# Patient Record
Sex: MALE | Race: OTHER | Employment: STUDENT | URBAN - METROPOLITAN AREA
[De-identification: names, ages, dates, MRNs, and addresses within clinical notes are randomized per-mention and may not be internally consistent; named-entity substitution may affect disease eponyms.]

---
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
## 2022-09-20 ENCOUNTER — OFFICE VISIT (OUTPATIENT)
Dept: FAMILY MEDICINE CLINIC | Age: 22
End: 2022-09-20
Payer: COMMERCIAL

## 2022-09-20 ENCOUNTER — TELEPHONE (OUTPATIENT)
Dept: FAMILY MEDICINE CLINIC | Age: 22
End: 2022-09-20

## 2022-09-20 VITALS
BODY MASS INDEX: 31.25 KG/M2 | HEIGHT: 71 IN | HEART RATE: 92 BPM | SYSTOLIC BLOOD PRESSURE: 120 MMHG | RESPIRATION RATE: 16 BRPM | DIASTOLIC BLOOD PRESSURE: 80 MMHG | WEIGHT: 223.2 LBS | OXYGEN SATURATION: 98 %

## 2022-09-20 DIAGNOSIS — Z13.31 POSITIVE DEPRESSION SCREENING: ICD-10-CM

## 2022-09-20 DIAGNOSIS — Z00.00 ENCOUNTER FOR MEDICAL EXAMINATION TO ESTABLISH CARE: ICD-10-CM

## 2022-09-20 DIAGNOSIS — F41.9 SEVERE ANXIETY: ICD-10-CM

## 2022-09-20 DIAGNOSIS — F32.A DEPRESSION, UNSPECIFIED DEPRESSION TYPE: Primary | ICD-10-CM

## 2022-09-20 PROCEDURE — 1036F TOBACCO NON-USER: CPT | Performed by: NURSE PRACTITIONER

## 2022-09-20 PROCEDURE — G8427 DOCREV CUR MEDS BY ELIG CLIN: HCPCS | Performed by: NURSE PRACTITIONER

## 2022-09-20 PROCEDURE — G8417 CALC BMI ABV UP PARAM F/U: HCPCS | Performed by: NURSE PRACTITIONER

## 2022-09-20 PROCEDURE — 36415 COLL VENOUS BLD VENIPUNCTURE: CPT | Performed by: NURSE PRACTITIONER

## 2022-09-20 PROCEDURE — 99214 OFFICE O/P EST MOD 30 MIN: CPT | Performed by: NURSE PRACTITIONER

## 2022-09-20 RX ORDER — BUSPIRONE HYDROCHLORIDE 5 MG/1
5 TABLET ORAL 3 TIMES DAILY
Qty: 90 TABLET | Refills: 1 | Status: SHIPPED | OUTPATIENT
Start: 2022-09-20 | End: 2022-10-20

## 2022-09-20 SDOH — ECONOMIC STABILITY: FOOD INSECURITY: WITHIN THE PAST 12 MONTHS, YOU WORRIED THAT YOUR FOOD WOULD RUN OUT BEFORE YOU GOT MONEY TO BUY MORE.: NEVER TRUE

## 2022-09-20 SDOH — ECONOMIC STABILITY: FOOD INSECURITY: WITHIN THE PAST 12 MONTHS, THE FOOD YOU BOUGHT JUST DIDN'T LAST AND YOU DIDN'T HAVE MONEY TO GET MORE.: NEVER TRUE

## 2022-09-20 ASSESSMENT — PATIENT HEALTH QUESTIONNAIRE - PHQ9
SUM OF ALL RESPONSES TO PHQ QUESTIONS 1-9: 17
4. FEELING TIRED OR HAVING LITTLE ENERGY: 2
6. FEELING BAD ABOUT YOURSELF - OR THAT YOU ARE A FAILURE OR HAVE LET YOURSELF OR YOUR FAMILY DOWN: 2
10. IF YOU CHECKED OFF ANY PROBLEMS, HOW DIFFICULT HAVE THESE PROBLEMS MADE IT FOR YOU TO DO YOUR WORK, TAKE CARE OF THINGS AT HOME, OR GET ALONG WITH OTHER PEOPLE: 2
7. TROUBLE CONCENTRATING ON THINGS, SUCH AS READING THE NEWSPAPER OR WATCHING TELEVISION: 3
SUM OF ALL RESPONSES TO PHQ9 QUESTIONS 1 & 2: 4
5. POOR APPETITE OR OVEREATING: 3
2. FEELING DOWN, DEPRESSED OR HOPELESS: 2
9. THOUGHTS THAT YOU WOULD BE BETTER OFF DEAD, OR OF HURTING YOURSELF: 0
1. LITTLE INTEREST OR PLEASURE IN DOING THINGS: 2
8. MOVING OR SPEAKING SO SLOWLY THAT OTHER PEOPLE COULD HAVE NOTICED. OR THE OPPOSITE, BEING SO FIGETY OR RESTLESS THAT YOU HAVE BEEN MOVING AROUND A LOT MORE THAN USUAL: 1
3. TROUBLE FALLING OR STAYING ASLEEP: 2
SUM OF ALL RESPONSES TO PHQ QUESTIONS 1-9: 17

## 2022-09-20 ASSESSMENT — ENCOUNTER SYMPTOMS
RECTAL PAIN: 0
ABDOMINAL DISTENTION: 0
WHEEZING: 0
BACK PAIN: 0
SINUS PAIN: 0
SINUS PRESSURE: 0
VOMITING: 0
NAUSEA: 0
EYE DISCHARGE: 0
SORE THROAT: 0
TROUBLE SWALLOWING: 0
COUGH: 0
EYE REDNESS: 0
BLOOD IN STOOL: 0
ABDOMINAL PAIN: 0
CHEST TIGHTNESS: 0
EYE ITCHING: 0
FACIAL SWELLING: 0
EYE PAIN: 0
SHORTNESS OF BREATH: 0
CONSTIPATION: 0
DIARRHEA: 0
COLOR CHANGE: 0

## 2022-09-20 ASSESSMENT — ANXIETY QUESTIONNAIRES
6. BECOMING EASILY ANNOYED OR IRRITABLE: 3
IF YOU CHECKED OFF ANY PROBLEMS ON THIS QUESTIONNAIRE, HOW DIFFICULT HAVE THESE PROBLEMS MADE IT FOR YOU TO DO YOUR WORK, TAKE CARE OF THINGS AT HOME, OR GET ALONG WITH OTHER PEOPLE: EXTREMELY DIFFICULT
2. NOT BEING ABLE TO STOP OR CONTROL WORRYING: 3
GAD7 TOTAL SCORE: 19
5. BEING SO RESTLESS THAT IT IS HARD TO SIT STILL: 3
4. TROUBLE RELAXING: 3
3. WORRYING TOO MUCH ABOUT DIFFERENT THINGS: 3
7. FEELING AFRAID AS IF SOMETHING AWFUL MIGHT HAPPEN: 1
1. FEELING NERVOUS, ANXIOUS, OR ON EDGE: 3

## 2022-09-20 ASSESSMENT — SOCIAL DETERMINANTS OF HEALTH (SDOH): HOW HARD IS IT FOR YOU TO PAY FOR THE VERY BASICS LIKE FOOD, HOUSING, MEDICAL CARE, AND HEATING?: NOT HARD AT ALL

## 2022-09-20 NOTE — PROGRESS NOTES
[address] [facility] MEDICINE  [address] [facility] [address]  Dept: [phone]  Loc: Brittaney Crawford (:  2000) is a 23 y.o. [facility] patient, here for evaluation of the following chief complaint(s):  New Patient (Previous pt of Dr. Gertrude Mcarthur), Hypertension (States that previous provider had him on 25mg of lisinopril ), and Anxiety      ASSESSMENT/PLAN:  1. Depression, unspecified depression type  -     MA DEPRESSION SCREEN ANNUAL  2. Encounter for medical examination to establish care  3. Positive depression screening  4. Severe anxiety  -     TSH; Future  -     busPIRone (BUSPAR) 5 MG tablet; Take 1 tablet by mouth 3 times daily, Disp-90 tablet, R-1Normal    22 y/o male here to establish care. Long hx of anxiety  MADI 7- score 19  PHQ9- score 17  Patient denies any SI/HI  Was on a medication last year but cannot remmeber the name, he thinks it was prozac, we will call the pharmacy. For the time being will start him on Buspra. There was concern for HTN, but stable today, likely r/t anxiety. Will monitor. He is a , so had a stress test in - normal.   F/u in 2 weeks. Also checking TSH    Return in about 2 weeks (around 10/4/2022) for anxiety f/u . SUBJECTIVE/OBJECTIVE:  Here with mother. 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     Increased   EAP working working with counseling    Works for [employer] for years. Quick to anger. has a past medical history of Anxiety and Hypertension. Review of Systems   Constitutional:  Negative for activity change, appetite change, fatigue, fever and unexpected weight change.    HENT:  Negative for congestion, dental problem, ear pain, facial swelling, mouth sores, postnasal drip, sinus pressure, sinus pain, sore throat and trouble swallowing. Eyes:  Negative for pain, discharge, redness, itching and visual disturbance. Respiratory:  Negative for cough, chest tightness, shortness of breath and wheezing. Cardiovascular:  Negative for chest pain, palpitations and leg swelling. Gastrointestinal:  Negative for abdominal distention, abdominal pain, blood in stool, constipation, diarrhea, nausea, rectal pain and vomiting. Endocrine: Negative for cold intolerance and heat intolerance. Genitourinary:  Negative for difficulty urinating, dysuria, frequency, hematuria, penile pain, scrotal swelling, testicular pain and urgency. Musculoskeletal:  Negative for arthralgias, back pain, gait problem and neck pain. Skin:  Negative for color change, rash and wound. Neurological:  Negative for dizziness, seizures, weakness, light-headedness, numbness and headaches. Psychiatric/Behavioral:  Positive for agitation, dysphoric mood and self-injury. Negative for sleep disturbance. The patient is nervous/anxious. Physical Exam  Vitals and nursing note reviewed. Constitutional:       General: He is not in acute distress. Appearance: Normal appearance. He is well-developed. He is not diaphoretic. HENT:      Head: Normocephalic and atraumatic. Right Ear: Tympanic membrane and external ear normal. Tympanic membrane is not injected or erythematous. Left Ear: Tympanic membrane and external ear normal. Tympanic membrane is not injected or erythematous. Nose: Nose normal.      Mouth/Throat:      Mouth: Mucous membranes are moist.      Pharynx: Oropharynx is clear. Uvula midline. Eyes:      General:         Right eye: No discharge. Left eye: No discharge. Conjunctiva/sclera: Conjunctivae normal.      Pupils: Pupils are equal, round, and reactive to light. Neck:      Thyroid: No thyromegaly.       Trachea: Trachea normal.   Cardiovascular:      Rate and Rhythm: Normal rate and regular rhythm. Pulses: Normal pulses. Heart sounds: Normal heart sounds, S1 normal and S2 normal. No murmur heard. No friction rub. No gallop. Pulmonary:      Effort: Pulmonary effort is normal. No respiratory distress. Breath sounds: Normal breath sounds. No wheezing or rales. Chest:      Chest wall: No tenderness. Abdominal:      General: Bowel sounds are normal. There is no distension. Palpations: Abdomen is soft. There is no mass. Tenderness: There is no abdominal tenderness. There is no guarding or rebound. Musculoskeletal:         General: No tenderness or deformity. Normal range of motion. Cervical back: Full passive range of motion without pain, normal range of motion and neck supple. Lymphadenopathy:      Cervical: No cervical adenopathy. Skin:     General: Skin is warm and dry. Capillary Refill: Capillary refill takes less than 2 seconds. Neurological:      General: No focal deficit present. Mental Status: He is alert and oriented to person, place, and time. Deep Tendon Reflexes: Reflexes are normal and symmetric. Psychiatric:         Attention and Perception: Attention normal.         Mood and Affect: Mood is anxious. Speech: Speech normal.         Behavior: Behavior normal. Behavior is cooperative. Thought Content: Thought content normal. Thought content does not include homicidal or suicidal ideation. Cognition and Memory: Cognition and memory normal.         Judgment: Judgment normal.           An electronic signature was used to authenticate this note.     --CAMDEN Farfan - CNP

## 2022-09-20 NOTE — TELEPHONE ENCOUNTER
Please call [facility] in [facility] and find out what anti depressant he was filling there. It was probably a year ago.  Thank you

## 2022-09-21 LAB — TSH SERPL DL<=0.05 MIU/L-ACNC: 1.38 UIU/ML (ref 0.4–4.2)

## 2022-10-05 ENCOUNTER — OFFICE VISIT (OUTPATIENT)
Dept: FAMILY MEDICINE CLINIC | Age: 22
End: 2022-10-05
Payer: COMMERCIAL

## 2022-10-05 VITALS
OXYGEN SATURATION: 98 % | BODY MASS INDEX: 32.4 KG/M2 | WEIGHT: 231.4 LBS | RESPIRATION RATE: 16 BRPM | DIASTOLIC BLOOD PRESSURE: 68 MMHG | HEART RATE: 90 BPM | HEIGHT: 71 IN | SYSTOLIC BLOOD PRESSURE: 120 MMHG

## 2022-10-05 DIAGNOSIS — F41.9 SEVERE ANXIETY: ICD-10-CM

## 2022-10-05 DIAGNOSIS — F32.A DEPRESSION, UNSPECIFIED DEPRESSION TYPE: Primary | ICD-10-CM

## 2022-10-05 PROCEDURE — 99214 OFFICE O/P EST MOD 30 MIN: CPT | Performed by: NURSE PRACTITIONER

## 2022-10-05 PROCEDURE — 1036F TOBACCO NON-USER: CPT | Performed by: NURSE PRACTITIONER

## 2022-10-05 PROCEDURE — G8484 FLU IMMUNIZE NO ADMIN: HCPCS | Performed by: NURSE PRACTITIONER

## 2022-10-05 PROCEDURE — G8417 CALC BMI ABV UP PARAM F/U: HCPCS | Performed by: NURSE PRACTITIONER

## 2022-10-05 PROCEDURE — G8427 DOCREV CUR MEDS BY ELIG CLIN: HCPCS | Performed by: NURSE PRACTITIONER

## 2022-10-05 RX ORDER — FLUOXETINE HYDROCHLORIDE 20 MG/1
20 CAPSULE ORAL DAILY
Qty: 30 CAPSULE | Refills: 5 | Status: SHIPPED | OUTPATIENT
Start: 2022-10-05 | End: 2022-11-04

## 2022-10-05 ASSESSMENT — ENCOUNTER SYMPTOMS
ABDOMINAL DISTENTION: 0
BACK PAIN: 0
EYE DISCHARGE: 0
CONSTIPATION: 0
COLOR CHANGE: 0
CHEST TIGHTNESS: 0
TROUBLE SWALLOWING: 0
SORE THROAT: 0
EYE PAIN: 0
WHEEZING: 0
VOMITING: 0
DIARRHEA: 0
ABDOMINAL PAIN: 0
EYE REDNESS: 0
EYE ITCHING: 0
BLOOD IN STOOL: 0
RECTAL PAIN: 0
FACIAL SWELLING: 0
SINUS PRESSURE: 0
NAUSEA: 0
COUGH: 0
SINUS PAIN: 0
SHORTNESS OF BREATH: 0

## 2022-10-05 NOTE — PROGRESS NOTES
[address] [address]  Dept: [phone]  Loc: Brittaney Crawford (:  2000) is a 23 y.o. male,Established patient, here for evaluation of the following chief complaint(s):  Follow-up (Depression/anxiety- pt states that medication is helping some- still has anxiety )      ASSESSMENT/PLAN:  1. Depression, unspecified depression type  -     FLUoxetine (PROZAC) 20 MG capsule; Take 1 capsule by mouth daily, Disp-30 capsule, R-5Normal  2. Severe anxiety  -     FLUoxetine (PROZAC) 20 MG capsule; Take 1 capsule by mouth daily, Disp-30 capsule, R-5Normal  Patient with continued Anxiety, has improved a bit with Buspat. Continue Buspar. Start prozac 20 mg daily  Pt denies any SI/HI  F/u in 4 weeks. Declines vaccines. Return in about 4 weeks (around 2022) for anxiety . The usual starting dose of fluoxetine is 20 mg taken once daily. For patients who do not respond within two to four weeks, the dose is increased by 10 to 20 mg per day, depending on how well the medication is tolerated. Patients who remain unresponsive to treatment should receive additional increases of 10 to 20 mg per day every two to four weeks as tolerated, to an effective dose. The maximum dose is 60 mg per day. The dose schedule for other SSRIs and the adverse side effects of SSRIs are discussed separately. SUBJECTIVE/OBJECTIVE:  Here to follow up for anxiety, buspar has helped but still having anxiety        has a past medical history of Anxiety and Hypertension. Review of Systems   Constitutional:  Negative for activity change, appetite change, fatigue, fever and unexpected weight change. HENT:  Negative for congestion, dental problem, ear pain, facial swelling, mouth sores, postnasal drip, sinus pressure, sinus pain, sore throat and trouble swallowing.     Eyes:  Negative for pain, discharge, redness, itching and visual disturbance. Respiratory:  Negative for cough, chest tightness, shortness of breath and wheezing. Cardiovascular:  Negative for chest pain, palpitations and leg swelling. Gastrointestinal:  Negative for abdominal distention, abdominal pain, blood in stool, constipation, diarrhea, nausea, rectal pain and vomiting. Endocrine: Negative for cold intolerance and heat intolerance. Genitourinary:  Negative for difficulty urinating, dysuria, frequency, hematuria, penile pain, scrotal swelling, testicular pain and urgency. Musculoskeletal:  Negative for arthralgias, back pain, gait problem and neck pain. Skin:  Negative for color change, rash and wound. Neurological:  Negative for dizziness, seizures, weakness, light-headedness, numbness and headaches. Psychiatric/Behavioral:  Negative for agitation, dysphoric mood and sleep disturbance. The patient is nervous/anxious. Physical Exam  Vitals and nursing note reviewed. Constitutional:       General: He is not in acute distress. Appearance: He is well-developed. He is not diaphoretic. HENT:      Head: Normocephalic and atraumatic. Right Ear: Tympanic membrane and external ear normal. Tympanic membrane is not injected or erythematous. Left Ear: Tympanic membrane and external ear normal. Tympanic membrane is not injected or erythematous. Nose: Nose normal.      Mouth/Throat:      Pharynx: Uvula midline. Eyes:      General:         Right eye: No discharge. Left eye: No discharge. Conjunctiva/sclera: Conjunctivae normal.      Pupils: Pupils are equal, round, and reactive to light. Neck:      Thyroid: No thyromegaly. Trachea: Trachea normal.   Cardiovascular:      Rate and Rhythm: Normal rate and regular rhythm. Pulses: Normal pulses. Heart sounds: Normal heart sounds, S1 normal and S2 normal. No murmur heard. No friction rub. No gallop.    Pulmonary:      Effort: Pulmonary effort is normal. No respiratory distress. Breath sounds: Normal breath sounds. No wheezing or rales. Chest:      Chest wall: No tenderness. Abdominal:      General: Bowel sounds are normal. There is no distension. Palpations: Abdomen is soft. There is no mass. Tenderness: There is no abdominal tenderness. There is no guarding or rebound. Musculoskeletal:         General: No tenderness or deformity. Normal range of motion. Cervical back: Full passive range of motion without pain, normal range of motion and neck supple. Lymphadenopathy:      Cervical: No cervical adenopathy. Skin:     General: Skin is warm and dry. Capillary Refill: Capillary refill takes less than 2 seconds. Neurological:      Mental Status: He is alert and oriented to person, place, and time. Deep Tendon Reflexes: Reflexes are normal and symmetric. Psychiatric:         Attention and Perception: Attention normal.         Mood and Affect: Mood normal.         Behavior: Behavior normal.         Thought Content: Thought content normal. Thought content does not include homicidal or suicidal ideation. Judgment: Judgment normal.           An electronic signature was used to authenticate this note.     --CAMDEN Cordoba - CNP

## 2022-11-09 ENCOUNTER — OFFICE VISIT (OUTPATIENT)
Dept: FAMILY MEDICINE CLINIC | Age: 22
End: 2022-11-09
Payer: COMMERCIAL

## 2022-11-09 VITALS
HEIGHT: 71 IN | RESPIRATION RATE: 16 BRPM | WEIGHT: 225 LBS | SYSTOLIC BLOOD PRESSURE: 106 MMHG | OXYGEN SATURATION: 98 % | HEART RATE: 70 BPM | DIASTOLIC BLOOD PRESSURE: 70 MMHG | BODY MASS INDEX: 31.5 KG/M2

## 2022-11-09 DIAGNOSIS — F32.A DEPRESSION, UNSPECIFIED DEPRESSION TYPE: Primary | ICD-10-CM

## 2022-11-09 DIAGNOSIS — F41.9 SEVERE ANXIETY: ICD-10-CM

## 2022-11-09 PROCEDURE — G8484 FLU IMMUNIZE NO ADMIN: HCPCS | Performed by: NURSE PRACTITIONER

## 2022-11-09 PROCEDURE — G8417 CALC BMI ABV UP PARAM F/U: HCPCS | Performed by: NURSE PRACTITIONER

## 2022-11-09 PROCEDURE — 1036F TOBACCO NON-USER: CPT | Performed by: NURSE PRACTITIONER

## 2022-11-09 PROCEDURE — G8427 DOCREV CUR MEDS BY ELIG CLIN: HCPCS | Performed by: NURSE PRACTITIONER

## 2022-11-09 PROCEDURE — 99214 OFFICE O/P EST MOD 30 MIN: CPT | Performed by: NURSE PRACTITIONER

## 2022-11-09 RX ORDER — BUSPIRONE HYDROCHLORIDE 5 MG/1
TABLET ORAL
COMMUNITY
Start: 2022-10-26 | End: 2022-11-09 | Stop reason: SDUPTHER

## 2022-11-09 RX ORDER — BUSPIRONE HYDROCHLORIDE 10 MG/1
10 TABLET ORAL 3 TIMES DAILY PRN
Qty: 90 TABLET | Refills: 2 | Status: SHIPPED | OUTPATIENT
Start: 2022-11-09 | End: 2022-12-09

## 2022-11-09 ASSESSMENT — ENCOUNTER SYMPTOMS
COUGH: 0
EYE PAIN: 0
ABDOMINAL DISTENTION: 0
WHEEZING: 0
SORE THROAT: 0
BACK PAIN: 0
EYE REDNESS: 0
CHEST TIGHTNESS: 0
SINUS PAIN: 0
COLOR CHANGE: 0
VOMITING: 0
RECTAL PAIN: 0
NAUSEA: 0
SINUS PRESSURE: 0
FACIAL SWELLING: 0
EYE DISCHARGE: 0
TROUBLE SWALLOWING: 0
DIARRHEA: 0
CONSTIPATION: 0
SHORTNESS OF BREATH: 0
EYE ITCHING: 0
ABDOMINAL PAIN: 0
BLOOD IN STOOL: 0

## 2022-11-09 NOTE — PROGRESS NOTES
[address] [address]  Dept: [phone]  Loc: Brittaney Crawford (:  2000) is a 23 y.o. male,Established patient, here for evaluation of the following chief complaint(s):  1 Month Follow-Up (Depression/ anxiety - not much improvement )      ASSESSMENT/PLAN:  1. Depression, unspecified depression type  2. Severe anxiety  Patient denies any SI/HI. He is going to continue Prozac 20 mg for least another 2 weeks to get a full 6 weeks and see how he feels on that. He did state that he has been taking 10 mg of the BuSpar in the morning and 5 mg at night he felt the 10 mg was more helpful, I reassured him that he could take 10 mg 3 times a day if needed for the anxiety and he reported he would likely increase that dose. He is going to update myself in 2 weeks with a made MyChart message on how he feels after a total of 6 weeks on the Prozac. Return in about 2 weeks (around 2022) for send Carroll Zhang a [employer] message update . SUBJECTIVE/OBJECTIVE:  Patient following up after being on Prozac for 4 weeks and taking BuSpar as needed. He states he has not noticed much improvement with his mood with the Prozac, but denies any negative symptoms. Had been taking BuSpar 10 mg in the morning and 5 mg at night.       has a past medical history of Anxiety and Hypertension. Review of Systems   Constitutional:  Negative for activity change, appetite change, fatigue, fever and unexpected weight change. HENT:  Negative for congestion, dental problem, ear pain, facial swelling, mouth sores, postnasal drip, sinus pressure, sinus pain, sore throat and trouble swallowing. Eyes:  Negative for pain, discharge, redness, itching and visual disturbance. Respiratory:  Negative for cough, chest tightness, shortness of breath and wheezing. Cardiovascular:  Negative for chest pain, palpitations and leg swelling. Gastrointestinal:  Negative for abdominal distention, abdominal pain, blood in stool, constipation, diarrhea, nausea, rectal pain and vomiting. Endocrine: Negative for cold intolerance and heat intolerance. Genitourinary:  Negative for difficulty urinating, dysuria, frequency, hematuria, penile pain, scrotal swelling, testicular pain and urgency. Musculoskeletal:  Negative for arthralgias, back pain, gait problem and neck pain. Skin:  Negative for color change, rash and wound. Neurological:  Negative for dizziness, seizures, weakness, light-headedness, numbness and headaches. Psychiatric/Behavioral:  Positive for agitation and dysphoric mood. Negative for sleep disturbance. The patient is nervous/anxious. Physical Exam  Vitals and nursing note reviewed. Constitutional:       General: He is not in acute distress. Appearance: He is well-developed. He is not diaphoretic. HENT:      Head: Normocephalic and atraumatic. Right Ear: Tympanic membrane and external ear normal. Tympanic membrane is not injected or erythematous. Left Ear: Tympanic membrane and external ear normal. Tympanic membrane is not injected or erythematous. Nose: Nose normal.      Mouth/Throat:      Pharynx: Uvula midline. Eyes:      General:         Right eye: No discharge. Left eye: No discharge. Conjunctiva/sclera: Conjunctivae normal.      Pupils: Pupils are equal, round, and reactive to light. Neck:      Thyroid: No thyromegaly. Trachea: Trachea normal.   Cardiovascular:      Rate and Rhythm: Normal rate and regular rhythm. Pulses: Normal pulses. Heart sounds: Normal heart sounds, S1 normal and S2 normal. No murmur heard. No friction rub. No gallop. Pulmonary:      Effort: Pulmonary effort is normal. No respiratory distress. Breath sounds: Normal breath sounds. No wheezing or rales. Chest:      Chest wall: No tenderness.    Abdominal:      General: Bowel sounds are

## 2022-11-18 DIAGNOSIS — F41.9 SEVERE ANXIETY: ICD-10-CM

## 2022-11-18 RX ORDER — BUSPIRONE HYDROCHLORIDE 5 MG/1
TABLET ORAL
Qty: 90 TABLET | Refills: 1 | OUTPATIENT
Start: 2022-11-18

## 2022-12-07 ENCOUNTER — PATIENT MESSAGE (OUTPATIENT)
Dept: FAMILY MEDICINE CLINIC | Age: 22
End: 2022-12-07

## 2022-12-07 NOTE — TELEPHONE ENCOUNTER
From: Roly March  To: Claude Pod  Sent: 12/7/2022 2:17 PM EST  Subject: Fluoxetine dose    After about 3 additional weeks the fluoxetine feels like it has been helping I think were ok to stay at 20 mg.

## 2024-03-24 ASSESSMENT — PATIENT HEALTH QUESTIONNAIRE - PHQ9
2. FEELING DOWN, DEPRESSED OR HOPELESS: MORE THAN HALF THE DAYS
10. IF YOU CHECKED OFF ANY PROBLEMS, HOW DIFFICULT HAVE THESE PROBLEMS MADE IT FOR YOU TO DO YOUR WORK, TAKE CARE OF THINGS AT HOME, OR GET ALONG WITH OTHER PEOPLE: VERY DIFFICULT
7. TROUBLE CONCENTRATING ON THINGS, SUCH AS READING THE NEWSPAPER OR WATCHING TELEVISION: MORE THAN HALF THE DAYS
6. FEELING BAD ABOUT YOURSELF - OR THAT YOU ARE A FAILURE OR HAVE LET YOURSELF OR YOUR FAMILY DOWN: NEARLY EVERY DAY
4. FEELING TIRED OR HAVING LITTLE ENERGY: MORE THAN HALF THE DAYS
1. LITTLE INTEREST OR PLEASURE IN DOING THINGS: NEARLY EVERY DAY
6. FEELING BAD ABOUT YOURSELF - OR THAT YOU ARE A FAILURE OR HAVE LET YOURSELF OR YOUR FAMILY DOWN: NEARLY EVERY DAY
SUM OF ALL RESPONSES TO PHQ QUESTIONS 1-9: 18
4. FEELING TIRED OR HAVING LITTLE ENERGY: MORE THAN HALF THE DAYS
SUM OF ALL RESPONSES TO PHQ QUESTIONS 1-9: 18
8. MOVING OR SPEAKING SO SLOWLY THAT OTHER PEOPLE COULD HAVE NOTICED. OR THE OPPOSITE, BEING SO FIGETY OR RESTLESS THAT YOU HAVE BEEN MOVING AROUND A LOT MORE THAN USUAL: SEVERAL DAYS
3. TROUBLE FALLING OR STAYING ASLEEP: NEARLY EVERY DAY
3. TROUBLE FALLING OR STAYING ASLEEP: NEARLY EVERY DAY
9. THOUGHTS THAT YOU WOULD BE BETTER OFF DEAD, OR OF HURTING YOURSELF: NOT AT ALL
SUM OF ALL RESPONSES TO PHQ QUESTIONS 1-9: 18
1. LITTLE INTEREST OR PLEASURE IN DOING THINGS: NEARLY EVERY DAY
SUM OF ALL RESPONSES TO PHQ9 QUESTIONS 1 & 2: 5
5. POOR APPETITE OR OVEREATING: MORE THAN HALF THE DAYS
9. THOUGHTS THAT YOU WOULD BE BETTER OFF DEAD, OR OF HURTING YOURSELF: NOT AT ALL
8. MOVING OR SPEAKING SO SLOWLY THAT OTHER PEOPLE COULD HAVE NOTICED. OR THE OPPOSITE - BEING SO FIDGETY OR RESTLESS THAT YOU HAVE BEEN MOVING AROUND A LOT MORE THAN USUAL: SEVERAL DAYS
10. IF YOU CHECKED OFF ANY PROBLEMS, HOW DIFFICULT HAVE THESE PROBLEMS MADE IT FOR YOU TO DO YOUR WORK, TAKE CARE OF THINGS AT HOME, OR GET ALONG WITH OTHER PEOPLE: VERY DIFFICULT
5. POOR APPETITE OR OVEREATING: MORE THAN HALF THE DAYS
SUM OF ALL RESPONSES TO PHQ QUESTIONS 1-9: 18
SUM OF ALL RESPONSES TO PHQ QUESTIONS 1-9: 18
7. TROUBLE CONCENTRATING ON THINGS, SUCH AS READING THE NEWSPAPER OR WATCHING TELEVISION: MORE THAN HALF THE DAYS
2. FEELING DOWN, DEPRESSED OR HOPELESS: MORE THAN HALF THE DAYS

## 2024-03-25 ENCOUNTER — OFFICE VISIT (OUTPATIENT)
Dept: FAMILY MEDICINE CLINIC | Age: 24
End: 2024-03-25
Payer: COMMERCIAL

## 2024-03-25 VITALS
HEART RATE: 73 BPM | BODY MASS INDEX: 33.32 KG/M2 | SYSTOLIC BLOOD PRESSURE: 126 MMHG | OXYGEN SATURATION: 99 % | DIASTOLIC BLOOD PRESSURE: 88 MMHG | HEIGHT: 71 IN | WEIGHT: 238 LBS | RESPIRATION RATE: 18 BRPM | TEMPERATURE: 97.5 F

## 2024-03-25 DIAGNOSIS — Z23 NEED FOR TETANUS BOOSTER: ICD-10-CM

## 2024-03-25 DIAGNOSIS — Z00.00 ENCOUNTER FOR WELL ADULT EXAM WITHOUT ABNORMAL FINDINGS: Primary | ICD-10-CM

## 2024-03-25 DIAGNOSIS — R53.83 OTHER FATIGUE: ICD-10-CM

## 2024-03-25 DIAGNOSIS — F41.9 SEVERE ANXIETY: ICD-10-CM

## 2024-03-25 DIAGNOSIS — F32.A DEPRESSION, UNSPECIFIED DEPRESSION TYPE: ICD-10-CM

## 2024-03-25 DIAGNOSIS — Z13.220 LIPID SCREENING: ICD-10-CM

## 2024-03-25 LAB
25(OH)D3 SERPL-MCNC: 21 NG/ML (ref 30–100)
ALBUMIN SERPL BCG-MCNC: 4.9 G/DL (ref 3.5–5.1)
ALP SERPL-CCNC: 86 U/L (ref 38–126)
ALT SERPL W/O P-5'-P-CCNC: 139 U/L (ref 11–66)
ANION GAP SERPL CALC-SCNC: 14 MEQ/L (ref 8–16)
AST SERPL-CCNC: 43 U/L (ref 5–40)
BASOPHILS ABSOLUTE: 0 THOU/MM3 (ref 0–0.1)
BASOPHILS NFR BLD AUTO: 0.5 %
BILIRUB SERPL-MCNC: 0.5 MG/DL (ref 0.3–1.2)
BUN SERPL-MCNC: 13 MG/DL (ref 7–22)
CALCIUM SERPL-MCNC: 10 MG/DL (ref 8.5–10.5)
CHLORIDE SERPL-SCNC: 101 MEQ/L (ref 98–111)
CHOLESTEROL, FASTING: 228 MG/DL (ref 100–199)
CO2 SERPL-SCNC: 25 MEQ/L (ref 23–33)
CREAT SERPL-MCNC: 1 MG/DL (ref 0.4–1.2)
DEPRECATED RDW RBC AUTO: 41 FL (ref 35–45)
EOSINOPHIL NFR BLD AUTO: 0.8 %
EOSINOPHILS ABSOLUTE: 0.1 THOU/MM3 (ref 0–0.4)
ERYTHROCYTE [DISTWIDTH] IN BLOOD BY AUTOMATED COUNT: 12.7 % (ref 11.5–14.5)
GFR SERPL CREATININE-BSD FRML MDRD: > 90 ML/MIN/1.73M2
GLUCOSE FASTING: 105 MG/DL (ref 70–108)
HCT VFR BLD AUTO: 51.8 % (ref 42–52)
HDLC SERPL-MCNC: 30 MG/DL
HGB BLD-MCNC: 18.3 GM/DL (ref 14–18)
IMM GRANULOCYTES # BLD AUTO: 0.03 THOU/MM3 (ref 0–0.07)
IMM GRANULOCYTES NFR BLD AUTO: 0.4 %
LDLC SERPL CALC-MCNC: ABNORMAL MG/DL
LYMPHOCYTES ABSOLUTE: 2.5 THOU/MM3 (ref 1–4.8)
LYMPHOCYTES NFR BLD AUTO: 33.5 %
MCH RBC QN AUTO: 31.4 PG (ref 26–33)
MCHC RBC AUTO-ENTMCNC: 35.3 GM/DL (ref 32.2–35.5)
MCV RBC AUTO: 88.9 FL (ref 80–94)
MONOCYTES ABSOLUTE: 0.5 THOU/MM3 (ref 0.4–1.3)
MONOCYTES NFR BLD AUTO: 6.5 %
NEUTROPHILS NFR BLD AUTO: 58.3 %
NRBC BLD AUTO-RTO: 0 /100 WBC
PATHOLOGIST REVIEW: ABNORMAL
PLATELET # BLD AUTO: 284 THOU/MM3 (ref 130–400)
PMV BLD AUTO: 10 FL (ref 9.4–12.4)
POTASSIUM SERPL-SCNC: 4.2 MEQ/L (ref 3.5–5.2)
PROT SERPL-MCNC: 7.4 G/DL (ref 6.1–8)
RBC # BLD AUTO: 5.83 MILL/MM3 (ref 4.7–6.1)
SCAN OF BLOOD SMEAR: NORMAL
SEGMENTED NEUTROPHILS ABSOLUTE COUNT: 4.3 THOU/MM3 (ref 1.8–7.7)
SODIUM SERPL-SCNC: 140 MEQ/L (ref 135–145)
T4 FREE SERPL-MCNC: 1.21 NG/DL (ref 0.93–1.68)
TRIGLYCERIDE, FASTING: 411 MG/DL (ref 0–199)
TSH SERPL DL<=0.005 MIU/L-ACNC: 1.05 UIU/ML (ref 0.4–4.2)
WBC # BLD AUTO: 7.4 THOU/MM3 (ref 4.8–10.8)

## 2024-03-25 PROCEDURE — G8427 DOCREV CUR MEDS BY ELIG CLIN: HCPCS | Performed by: NURSE PRACTITIONER

## 2024-03-25 PROCEDURE — 36415 COLL VENOUS BLD VENIPUNCTURE: CPT | Performed by: NURSE PRACTITIONER

## 2024-03-25 PROCEDURE — 99395 PREV VISIT EST AGE 18-39: CPT | Performed by: NURSE PRACTITIONER

## 2024-03-25 PROCEDURE — 99213 OFFICE O/P EST LOW 20 MIN: CPT | Performed by: NURSE PRACTITIONER

## 2024-03-25 PROCEDURE — G8484 FLU IMMUNIZE NO ADMIN: HCPCS | Performed by: NURSE PRACTITIONER

## 2024-03-25 PROCEDURE — 90715 TDAP VACCINE 7 YRS/> IM: CPT | Performed by: NURSE PRACTITIONER

## 2024-03-25 PROCEDURE — G8417 CALC BMI ABV UP PARAM F/U: HCPCS | Performed by: NURSE PRACTITIONER

## 2024-03-25 PROCEDURE — 90471 IMMUNIZATION ADMIN: CPT | Performed by: NURSE PRACTITIONER

## 2024-03-25 PROCEDURE — 1036F TOBACCO NON-USER: CPT | Performed by: NURSE PRACTITIONER

## 2024-03-25 RX ORDER — LANOLIN ALCOHOL/MO/W.PET/CERES
3 CREAM (GRAM) TOPICAL NIGHTLY PRN
COMMUNITY

## 2024-03-25 RX ORDER — FLUOXETINE HYDROCHLORIDE 20 MG/1
20 CAPSULE ORAL DAILY
Qty: 30 CAPSULE | Refills: 3 | Status: SHIPPED | OUTPATIENT
Start: 2024-03-25

## 2024-03-25 RX ORDER — BUSPIRONE HYDROCHLORIDE 5 MG/1
5 TABLET ORAL 3 TIMES DAILY
Qty: 90 TABLET | Refills: 2 | Status: SHIPPED | OUTPATIENT
Start: 2024-03-25 | End: 2025-03-25

## 2024-03-25 SDOH — ECONOMIC STABILITY: FOOD INSECURITY: WITHIN THE PAST 12 MONTHS, THE FOOD YOU BOUGHT JUST DIDN'T LAST AND YOU DIDN'T HAVE MONEY TO GET MORE.: NEVER TRUE

## 2024-03-25 SDOH — ECONOMIC STABILITY: FOOD INSECURITY: WITHIN THE PAST 12 MONTHS, YOU WORRIED THAT YOUR FOOD WOULD RUN OUT BEFORE YOU GOT MONEY TO BUY MORE.: NEVER TRUE

## 2024-03-25 SDOH — ECONOMIC STABILITY: INCOME INSECURITY: HOW HARD IS IT FOR YOU TO PAY FOR THE VERY BASICS LIKE FOOD, HOUSING, MEDICAL CARE, AND HEATING?: NOT HARD AT ALL

## 2024-03-25 SDOH — ECONOMIC STABILITY: HOUSING INSECURITY
IN THE LAST 12 MONTHS, WAS THERE A TIME WHEN YOU DID NOT HAVE A STEADY PLACE TO SLEEP OR SLEPT IN A SHELTER (INCLUDING NOW)?: NO

## 2024-03-25 ASSESSMENT — ENCOUNTER SYMPTOMS
SORE THROAT: 0
COUGH: 0
EYE REDNESS: 0
RECTAL PAIN: 0
NAUSEA: 0
EYE PAIN: 0
COLOR CHANGE: 0
EYE DISCHARGE: 0
TROUBLE SWALLOWING: 0
BLOOD IN STOOL: 0
VOMITING: 0
ABDOMINAL PAIN: 0
CHEST TIGHTNESS: 0
FACIAL SWELLING: 0
CONSTIPATION: 0
SHORTNESS OF BREATH: 0
EYE ITCHING: 0
ABDOMINAL DISTENTION: 0
SINUS PRESSURE: 0
SINUS PAIN: 0
DIARRHEA: 0
WHEEZING: 0
BACK PAIN: 0

## 2024-03-25 ASSESSMENT — ANXIETY QUESTIONNAIRES
7. FEELING AFRAID AS IF SOMETHING AWFUL MIGHT HAPPEN: NOT AT ALL
IF YOU CHECKED OFF ANY PROBLEMS ON THIS QUESTIONNAIRE, HOW DIFFICULT HAVE THESE PROBLEMS MADE IT FOR YOU TO DO YOUR WORK, TAKE CARE OF THINGS AT HOME, OR GET ALONG WITH OTHER PEOPLE: VERY DIFFICULT
2. NOT BEING ABLE TO STOP OR CONTROL WORRYING: MORE THAN HALF THE DAYS
GAD7 TOTAL SCORE: 14
3. WORRYING TOO MUCH ABOUT DIFFERENT THINGS: MORE THAN HALF THE DAYS
4. TROUBLE RELAXING: NEARLY EVERY DAY
1. FEELING NERVOUS, ANXIOUS, OR ON EDGE: MORE THAN HALF THE DAYS
6. BECOMING EASILY ANNOYED OR IRRITABLE: NEARLY EVERY DAY
5. BEING SO RESTLESS THAT IT IS HARD TO SIT STILL: MORE THAN HALF THE DAYS

## 2024-03-25 NOTE — PROGRESS NOTES
Health Maintenance Due   Topic Date Due    DTaP/Tdap/Td vaccine (6 - Tdap) 07/23/2014    HIV screen  Never done    Hepatitis C screen  Never done    Flu vaccine (1) 08/01/2023    COVID-19 Vaccine (3 - 2023-24 season) 09/01/2023

## 2024-03-25 NOTE — PROGRESS NOTES
After obtaining consent, and per orders of Saige MRAY, injection of TDap given in Left deltoid by ARPITA BEARDEN LPN. Patient instructed to remain in clinic for 20 minutes afterwards, and to report any adverse reaction to me immediately. Patient tolerated well. VIS given to patient. C reviewed prior to administration.     Immunizations Administered       Name Date Dose Route    TDaP, ADACEL (age 10y-64y), BOOSTRIX (age 10y+), IM, 0.5mL 3/25/2024 0.5 mL Intramuscular    Site: Deltoid- Left    Lot: [id number]    NDC: 68417-923-85

## 2024-03-25 NOTE — PATIENT INSTRUCTIONS
ways to manage stress.     If you're feeling depressed or hopeless, talk to someone. A counselor can help. If you don't have a counselor, talk to your doctor.   Talk to your doctor if you think you may have a problem with alcohol or drug use. This includes prescription medicines, marijuana, and other drugs.     Avoid tobacco and nicotine: Don't smoke, vape, or chew. If you need help quitting, talk to your doctor.   Practice safer sex. Getting tested, using condoms or dental dams, and limiting sex partners can help prevent STIs.     Use birth control if it's important to you to prevent pregnancy. Talk with your doctor about your choices and what might be best for you.   Prevent problems where you can. Protect your skin from too much sun, wash your hands, brush your teeth twice a day, and wear a seat belt in the car.   Where can you learn more?  Go to https://www.[employer].net/patientEd and enter P072 to learn more about \"Well Visit, Ages 18 to 65: Care Instructions.\"  Current as of: August 6, 2023               Content Version: 14.0  © 2006-2024 [employer].   Care instructions adapted under license by [employer]. If you have questions about a medical condition or this instruction, always ask your healthcare professional. [employer] disclaims any warranty or liability for your use of this information.

## 2024-03-25 NOTE — PROGRESS NOTES
Well Adult Note  Name: Sai Bustamante Today’s Date: 3/25/2024   MRN: [id number] Sex: Male   Age: 24 y.o. Ethnicity:  /    : 2000 Race:  /   More than one Race      Sai Bustamante is here for well adult exam.  History:  Stopped taking prozac 20 mg in 2023        Review of Systems   Constitutional:  Positive for fatigue. Negative for activity change, appetite change, fever and unexpected weight change.   HENT:  Negative for congestion, dental problem, ear pain, facial swelling, mouth sores, postnasal drip, sinus pressure, sinus pain, sore throat and trouble swallowing.    Eyes:  Negative for pain, discharge, redness, itching and visual disturbance.   Respiratory:  Negative for cough, chest tightness, shortness of breath and wheezing.    Cardiovascular:  Negative for chest pain, palpitations and leg swelling.   Gastrointestinal:  Negative for abdominal distention, abdominal pain, blood in stool, constipation, diarrhea, nausea, rectal pain and vomiting.   Endocrine: Negative for cold intolerance and heat intolerance.   Genitourinary:  Negative for difficulty urinating, dysuria, frequency, hematuria, penile pain, scrotal swelling, testicular pain and urgency.   Musculoskeletal:  Negative for arthralgias, back pain, gait problem and neck pain.   Skin:  Negative for color change, rash and wound.   Neurological:  Negative for dizziness, seizures, weakness, light-headedness, numbness and headaches.   Psychiatric/Behavioral:  Positive for agitation, dysphoric mood and sleep disturbance. The patient is nervous/anxious.        No Known Allergies      Prior to Visit Medications    Medication Sig Taking? Authorizing Provider   melatonin 3 MG TABS tablet Take 1 tablet by mouth nightly as needed Yes Provider, MD Faustina   FLUoxetine (PROZAC) 20 MG capsule Take 1 capsule by mouth daily Yes Saige Wilkinson, APRN - CNP   busPIRone (BUSPAR) 5 MG tablet Take 1 tablet by mouth 3 times daily Yes

## 2024-03-26 DIAGNOSIS — E55.9 VITAMIN D DEFICIENCY: Primary | ICD-10-CM

## 2024-03-26 RX ORDER — ERGOCALCIFEROL 1.25 MG/1
50000 CAPSULE ORAL WEEKLY
Qty: 12 CAPSULE | Refills: 1 | Status: SHIPPED | OUTPATIENT
Start: 2024-03-26

## 2024-03-27 ENCOUNTER — TELEPHONE (OUTPATIENT)
Dept: FAMILY MEDICINE CLINIC | Age: 24
End: 2024-03-27

## 2024-03-27 DIAGNOSIS — R74.8 ELEVATED LIVER ENZYMES: ICD-10-CM

## 2024-03-27 DIAGNOSIS — R79.89 LOW SERUM VITAMIN D: ICD-10-CM

## 2024-03-27 DIAGNOSIS — E78.2 MODERATE MIXED HYPERLIPIDEMIA NOT REQUIRING STATIN THERAPY: Primary | ICD-10-CM

## 2024-03-27 NOTE — RESULT ENCOUNTER NOTE
VIt D is pretty low at 21, normal is . I sent in a high dose once a week VIt D for him to start.   Lipids are also very high. Total cholesterol 228, should be below 200  Triglycerides 411, should be 150 or less. Unable to calculate LDL due to how anant the triglycerides are.   I would like to recheck Lipid when we recheck Liver enzymes and Vit D in 12 weeks.   Liver enzymes also elevated, likely from diet.   Encourage the below  Lifestyle Recommendations  AHA/ACC guidelines stress the importance of lifestyle modifications to lower cardiovascular disease risk. This includes eating a heart-healthy diet, regular aerobic exercises, maintenance of desirable body weight and avoidance of tobacco products.

## 2024-03-27 NOTE — TELEPHONE ENCOUNTER
----- Message from CAMDEN Nixon - CNP sent at 3/27/2024  4:04 PM EDT -----  VIt D is pretty low at 21, normal is . I sent in a high dose once a week VIt D for him to start.   Lipids are also very high. Total cholesterol 228, should be below 200  Triglycerides 411, should be 150 or less. Unable to calculate LDL due to how anant the triglycerides are.   I would like to recheck Lipid when we recheck Liver enzymes and Vit D in 12 weeks.   Liver enzymes also elevated, likely from diet.   Encourage the below  Lifestyle Recommendations  AHA/ACC guidelines stress the importance of lifestyle modifications to lower cardiovascular disease risk. This includes eating a heart-healthy diet, regular aerobic exercises, maintenance of desirable body weight and avoidance of tobacco products.

## 2024-05-09 ENCOUNTER — OFFICE VISIT (OUTPATIENT)
Dept: FAMILY MEDICINE CLINIC | Age: 24
End: 2024-05-09
Payer: COMMERCIAL

## 2024-05-09 VITALS
OXYGEN SATURATION: 98 % | DIASTOLIC BLOOD PRESSURE: 82 MMHG | HEART RATE: 80 BPM | WEIGHT: 219 LBS | RESPIRATION RATE: 16 BRPM | SYSTOLIC BLOOD PRESSURE: 132 MMHG | BODY MASS INDEX: 30.66 KG/M2 | HEIGHT: 71 IN

## 2024-05-09 DIAGNOSIS — F32.A DEPRESSION, UNSPECIFIED DEPRESSION TYPE: Primary | ICD-10-CM

## 2024-05-09 DIAGNOSIS — E55.9 VITAMIN D DEFICIENCY: ICD-10-CM

## 2024-05-09 DIAGNOSIS — F41.9 SEVERE ANXIETY: ICD-10-CM

## 2024-05-09 PROCEDURE — G8417 CALC BMI ABV UP PARAM F/U: HCPCS | Performed by: NURSE PRACTITIONER

## 2024-05-09 PROCEDURE — G8427 DOCREV CUR MEDS BY ELIG CLIN: HCPCS | Performed by: NURSE PRACTITIONER

## 2024-05-09 PROCEDURE — 99214 OFFICE O/P EST MOD 30 MIN: CPT | Performed by: NURSE PRACTITIONER

## 2024-05-09 PROCEDURE — 1036F TOBACCO NON-USER: CPT | Performed by: NURSE PRACTITIONER

## 2024-05-09 RX ORDER — ERGOCALCIFEROL 1.25 MG/1
50000 CAPSULE ORAL WEEKLY
Qty: 12 CAPSULE | Refills: 1 | Status: SHIPPED | OUTPATIENT
Start: 2024-05-09

## 2024-05-09 RX ORDER — FLUOXETINE HYDROCHLORIDE 40 MG/1
40 CAPSULE ORAL DAILY
Qty: 90 CAPSULE | Refills: 0 | Status: SHIPPED | OUTPATIENT
Start: 2024-05-09 | End: 2025-05-04

## 2024-05-09 ASSESSMENT — ANXIETY QUESTIONNAIRES
3. WORRYING TOO MUCH ABOUT DIFFERENT THINGS: MORE THAN HALF THE DAYS
IF YOU CHECKED OFF ANY PROBLEMS ON THIS QUESTIONNAIRE, HOW DIFFICULT HAVE THESE PROBLEMS MADE IT FOR YOU TO DO YOUR WORK, TAKE CARE OF THINGS AT HOME, OR GET ALONG WITH OTHER PEOPLE: VERY DIFFICULT
4. TROUBLE RELAXING: MORE THAN HALF THE DAYS
7. FEELING AFRAID AS IF SOMETHING AWFUL MIGHT HAPPEN: SEVERAL DAYS
GAD7 TOTAL SCORE: 14
6. BECOMING EASILY ANNOYED OR IRRITABLE: NEARLY EVERY DAY
1. FEELING NERVOUS, ANXIOUS, OR ON EDGE: MORE THAN HALF THE DAYS
5. BEING SO RESTLESS THAT IT IS HARD TO SIT STILL: MORE THAN HALF THE DAYS
2. NOT BEING ABLE TO STOP OR CONTROL WORRYING: MORE THAN HALF THE DAYS

## 2024-05-09 ASSESSMENT — ENCOUNTER SYMPTOMS
GASTROINTESTINAL NEGATIVE: 1
RESPIRATORY NEGATIVE: 1
ALLERGIC/IMMUNOLOGIC NEGATIVE: 1
EYES NEGATIVE: 1

## 2024-05-09 NOTE — PROGRESS NOTES
Health Maintenance Due   Topic Date Due    HIV screen  Never done    Hepatitis C screen  Never done    COVID-19 Vaccine (3 - 2023-24 season) 09/01/2023     
or irritable Nearly every day Nearly every day Nearly every day   Feeling afraid as if something awful might happen Several days Not at all Several days   MADI-7 Total Score 14 14 19   How difficult have these problems made it for you to do your work, take care of things at home, or get along with other people? Very difficult Very difficult Extremely difficult      Health maintenance labs and vaccines declined at today's visit.      Return in about 3 months (around 8/9/2024) for f/o lab work and medications.    SUBJECTIVE/OBJECTIVE:  Anxiousness has not improved. Denies negative effects from Prozac.     Ok to increase medications.          has a past medical history of Anxiety, Depression, and Hypertension.    Review of Systems   Constitutional: Negative.    HENT: Negative.     Eyes: Negative.    Respiratory: Negative.     Cardiovascular: Negative.    Gastrointestinal: Negative.    Endocrine: Negative.    Genitourinary: Negative.    Musculoskeletal: Negative.    Skin: Negative.    Allergic/Immunologic: Negative.    Neurological: Negative.    Hematological: Negative.    Psychiatric/Behavioral:  The patient is nervous/anxious.        Physical Exam  Constitutional:       Appearance: Normal appearance.   HENT:      Head: Normocephalic and atraumatic.      Nose: Nose normal.   Eyes:      Extraocular Movements: Extraocular movements intact.      Conjunctiva/sclera: Conjunctivae normal.      Pupils: Pupils are equal, round, and reactive to light.   Cardiovascular:      Rate and Rhythm: Normal rate and regular rhythm.   Pulmonary:      Effort: Pulmonary effort is normal.      Breath sounds: Normal breath sounds.   Abdominal:      General: Bowel sounds are normal.   Musculoskeletal:         General: Normal range of motion.      Cervical back: Normal range of motion.   Skin:     General: Skin is warm and dry.      Capillary Refill: Capillary refill takes less than 2 seconds.   Neurological:      General: No focal deficit

## 2024-07-10 ENCOUNTER — PATIENT MESSAGE (OUTPATIENT)
Dept: FAMILY MEDICINE CLINIC | Age: 24
End: 2024-07-10

## 2024-07-10 DIAGNOSIS — F32.A DEPRESSION, UNSPECIFIED DEPRESSION TYPE: ICD-10-CM

## 2024-07-10 DIAGNOSIS — F41.9 SEVERE ANXIETY: ICD-10-CM

## 2024-07-10 NOTE — TELEPHONE ENCOUNTER
From: Sai Bustamante  To: Saige Wilkinson  Sent: 7/10/2024 3:30 PM EDT  Subject: Dose changes     I have been taking the fluoxetine 40 mg once a day and buspirone 4 times daily for at total 20 mg and I think that dose has been working extremely well for me. The buspirone has been 10 mg in the morning, 5 mg at around 2 pm, and 5 mg at 8 pm.

## 2024-07-11 RX ORDER — BUSPIRONE HYDROCHLORIDE 5 MG/1
5 TABLET ORAL 4 TIMES DAILY
Qty: 360 TABLET | Refills: 3 | Status: SHIPPED | OUTPATIENT
Start: 2024-07-11 | End: 2025-07-11

## 2024-07-11 RX ORDER — FLUOXETINE HYDROCHLORIDE 40 MG/1
40 CAPSULE ORAL DAILY
Qty: 90 CAPSULE | Refills: 3 | Status: SHIPPED | OUTPATIENT
Start: 2024-07-11 | End: 2025-07-11

## 2025-05-18 ASSESSMENT — PATIENT HEALTH QUESTIONNAIRE - PHQ9
4. FEELING TIRED OR HAVING LITTLE ENERGY: NOT AT ALL
8. MOVING OR SPEAKING SO SLOWLY THAT OTHER PEOPLE COULD HAVE NOTICED. OR THE OPPOSITE, BEING SO FIGETY OR RESTLESS THAT YOU HAVE BEEN MOVING AROUND A LOT MORE THAN USUAL: NOT AT ALL
8. MOVING OR SPEAKING SO SLOWLY THAT OTHER PEOPLE COULD HAVE NOTICED. OR THE OPPOSITE - BEING SO FIDGETY OR RESTLESS THAT YOU HAVE BEEN MOVING AROUND A LOT MORE THAN USUAL: NOT AT ALL
SUM OF ALL RESPONSES TO PHQ QUESTIONS 1-9: 0
SUM OF ALL RESPONSES TO PHQ QUESTIONS 1-9: 0
3. TROUBLE FALLING OR STAYING ASLEEP: NOT AT ALL
6. FEELING BAD ABOUT YOURSELF - OR THAT YOU ARE A FAILURE OR HAVE LET YOURSELF OR YOUR FAMILY DOWN: NOT AT ALL
5. POOR APPETITE OR OVEREATING: NOT AT ALL
2. FEELING DOWN, DEPRESSED OR HOPELESS: NOT AT ALL
9. THOUGHTS THAT YOU WOULD BE BETTER OFF DEAD, OR OF HURTING YOURSELF: NOT AT ALL
10. IF YOU CHECKED OFF ANY PROBLEMS, HOW DIFFICULT HAVE THESE PROBLEMS MADE IT FOR YOU TO DO YOUR WORK, TAKE CARE OF THINGS AT HOME, OR GET ALONG WITH OTHER PEOPLE: NOT DIFFICULT AT ALL
9. THOUGHTS THAT YOU WOULD BE BETTER OFF DEAD, OR OF HURTING YOURSELF: NOT AT ALL
7. TROUBLE CONCENTRATING ON THINGS, SUCH AS READING THE NEWSPAPER OR WATCHING TELEVISION: NOT AT ALL
2. FEELING DOWN, DEPRESSED OR HOPELESS: NOT AT ALL
6. FEELING BAD ABOUT YOURSELF - OR THAT YOU ARE A FAILURE OR HAVE LET YOURSELF OR YOUR FAMILY DOWN: NOT AT ALL
3. TROUBLE FALLING OR STAYING ASLEEP: NOT AT ALL
5. POOR APPETITE OR OVEREATING: NOT AT ALL
4. FEELING TIRED OR HAVING LITTLE ENERGY: NOT AT ALL
1. LITTLE INTEREST OR PLEASURE IN DOING THINGS: NOT AT ALL
10. IF YOU CHECKED OFF ANY PROBLEMS, HOW DIFFICULT HAVE THESE PROBLEMS MADE IT FOR YOU TO DO YOUR WORK, TAKE CARE OF THINGS AT HOME, OR GET ALONG WITH OTHER PEOPLE: NOT DIFFICULT AT ALL
SUM OF ALL RESPONSES TO PHQ QUESTIONS 1-9: 0
7. TROUBLE CONCENTRATING ON THINGS, SUCH AS READING THE NEWSPAPER OR WATCHING TELEVISION: NOT AT ALL
SUM OF ALL RESPONSES TO PHQ QUESTIONS 1-9: 0
1. LITTLE INTEREST OR PLEASURE IN DOING THINGS: NOT AT ALL
SUM OF ALL RESPONSES TO PHQ QUESTIONS 1-9: 0

## 2025-05-21 ENCOUNTER — OFFICE VISIT (OUTPATIENT)
Dept: FAMILY MEDICINE CLINIC | Age: 25
End: 2025-05-21
Payer: COMMERCIAL

## 2025-05-21 VITALS
HEIGHT: 71 IN | OXYGEN SATURATION: 98 % | SYSTOLIC BLOOD PRESSURE: 120 MMHG | HEART RATE: 87 BPM | WEIGHT: 203 LBS | DIASTOLIC BLOOD PRESSURE: 84 MMHG | BODY MASS INDEX: 28.42 KG/M2 | RESPIRATION RATE: 16 BRPM

## 2025-05-21 DIAGNOSIS — Z13.220 LIPID SCREENING: ICD-10-CM

## 2025-05-21 DIAGNOSIS — Z00.00 ENCOUNTER FOR WELL ADULT EXAM WITHOUT ABNORMAL FINDINGS: Primary | ICD-10-CM

## 2025-05-21 DIAGNOSIS — E55.9 VITAMIN D DEFICIENCY: ICD-10-CM

## 2025-05-21 DIAGNOSIS — F32.A DEPRESSION, UNSPECIFIED DEPRESSION TYPE: ICD-10-CM

## 2025-05-21 LAB
25(OH)D3 SERPL-MCNC: 21 NG/ML (ref 30–100)
ALBUMIN SERPL BCG-MCNC: 4.6 G/DL (ref 3.4–4.9)
ALP SERPL-CCNC: 79 U/L (ref 40–129)
ALT SERPL W/O P-5'-P-CCNC: 44 U/L (ref 10–50)
ANION GAP SERPL CALC-SCNC: 10 MEQ/L (ref 8–16)
AST SERPL-CCNC: 22 U/L (ref 10–50)
BASOPHILS ABSOLUTE: 0.1 THOU/MM3 (ref 0–0.1)
BASOPHILS NFR BLD AUTO: 0.7 %
BILIRUB SERPL-MCNC: 0.3 MG/DL (ref 0.3–1.2)
BUN SERPL-MCNC: 17 MG/DL (ref 8–23)
CALCIUM SERPL-MCNC: 9.8 MG/DL (ref 8.6–10)
CHLORIDE SERPL-SCNC: 105 MEQ/L (ref 98–111)
CHOLESTEROL, FASTING: 165 MG/DL (ref 100–199)
CO2 SERPL-SCNC: 26 MEQ/L (ref 22–29)
CREAT SERPL-MCNC: 1.2 MG/DL (ref 0.7–1.2)
DEPRECATED RDW RBC AUTO: 39.7 FL (ref 35–45)
EOSINOPHIL NFR BLD AUTO: 0.8 %
EOSINOPHILS ABSOLUTE: 0.1 THOU/MM3 (ref 0–0.4)
ERYTHROCYTE [DISTWIDTH] IN BLOOD BY AUTOMATED COUNT: 12.5 % (ref 11.5–14.5)
GFR SERPL CREATININE-BSD FRML MDRD: 87 ML/MIN/1.73M2
GLUCOSE SERPL-MCNC: 98 MG/DL (ref 74–109)
HCT VFR BLD AUTO: 47.8 % (ref 42–52)
HDLC SERPL-MCNC: 31 MG/DL
HGB BLD-MCNC: 17.1 GM/DL (ref 14–18)
IMM GRANULOCYTES # BLD AUTO: 0.02 THOU/MM3 (ref 0–0.07)
IMM GRANULOCYTES NFR BLD AUTO: 0.3 %
LDLC SERPL CALC-MCNC: 117 MG/DL
LYMPHOCYTES ABSOLUTE: 1.9 THOU/MM3 (ref 1–4.8)
LYMPHOCYTES NFR BLD AUTO: 25.2 %
MCH RBC QN AUTO: 31.2 PG (ref 26–33)
MCHC RBC AUTO-ENTMCNC: 35.8 GM/DL (ref 32.2–35.5)
MCV RBC AUTO: 87.2 FL (ref 80–94)
MONOCYTES ABSOLUTE: 0.4 THOU/MM3 (ref 0.4–1.3)
MONOCYTES NFR BLD AUTO: 5.2 %
NEUTROPHILS ABSOLUTE: 5.1 THOU/MM3 (ref 1.8–7.7)
NEUTROPHILS NFR BLD AUTO: 67.8 %
NRBC BLD AUTO-RTO: 0 /100 WBC
PLATELET # BLD AUTO: 227 THOU/MM3 (ref 130–400)
PMV BLD AUTO: 10.9 FL (ref 9.4–12.4)
POTASSIUM SERPL-SCNC: 5 MEQ/L (ref 3.5–5.2)
PROT SERPL-MCNC: 6.8 G/DL (ref 6.4–8.3)
RBC # BLD AUTO: 5.48 MILL/MM3 (ref 4.7–6.1)
SODIUM SERPL-SCNC: 141 MEQ/L (ref 135–145)
TRIGLYCERIDE, FASTING: 83 MG/DL (ref 0–199)
WBC # BLD AUTO: 7.5 THOU/MM3 (ref 4.8–10.8)

## 2025-05-21 PROCEDURE — 1036F TOBACCO NON-USER: CPT | Performed by: NURSE PRACTITIONER

## 2025-05-21 PROCEDURE — 36415 COLL VENOUS BLD VENIPUNCTURE: CPT | Performed by: NURSE PRACTITIONER

## 2025-05-21 PROCEDURE — G8419 CALC BMI OUT NRM PARAM NOF/U: HCPCS | Performed by: NURSE PRACTITIONER

## 2025-05-21 PROCEDURE — G8427 DOCREV CUR MEDS BY ELIG CLIN: HCPCS | Performed by: NURSE PRACTITIONER

## 2025-05-21 PROCEDURE — 99395 PREV VISIT EST AGE 18-39: CPT | Performed by: NURSE PRACTITIONER

## 2025-05-21 SDOH — ECONOMIC STABILITY: FOOD INSECURITY: WITHIN THE PAST 12 MONTHS, YOU WORRIED THAT YOUR FOOD WOULD RUN OUT BEFORE YOU GOT MONEY TO BUY MORE.: NEVER TRUE

## 2025-05-21 SDOH — ECONOMIC STABILITY: FOOD INSECURITY: WITHIN THE PAST 12 MONTHS, THE FOOD YOU BOUGHT JUST DIDN'T LAST AND YOU DIDN'T HAVE MONEY TO GET MORE.: NEVER TRUE

## 2025-05-21 ASSESSMENT — ENCOUNTER SYMPTOMS
COLOR CHANGE: 0
COUGH: 0
BACK PAIN: 0
EYE REDNESS: 0
EYE PAIN: 0
VOMITING: 0
SINUS PRESSURE: 0
CONSTIPATION: 0
EYE ITCHING: 0
DIARRHEA: 0
FACIAL SWELLING: 0
CHEST TIGHTNESS: 0
SINUS PAIN: 0
RECTAL PAIN: 0
BLOOD IN STOOL: 0
NAUSEA: 0
SHORTNESS OF BREATH: 0
ABDOMINAL PAIN: 0
EYE DISCHARGE: 0
ABDOMINAL DISTENTION: 0
TROUBLE SWALLOWING: 0
SORE THROAT: 0
WHEEZING: 0

## 2025-05-21 NOTE — PATIENT INSTRUCTIONS
hands, brush your teeth twice a day, and wear a seat belt in the car.   Where can you learn more?  Go to [email or web address] and enter P072 to learn more about \"Well Visit, Ages 18 to 65: Care Instructions.\"  Current as of: April 30, 2024  Content Version: 14.4  © 6455-7422 "[employer].   Care instructions adapted under license by [employer]. If you have questions about a medical condition or this instruction, always ask your healthcare professional. [employer] disclaims any warranty or liability for your use of this information.

## 2025-05-21 NOTE — PROGRESS NOTES
2025    Sai Bustamante (:  2000) is a 25 y.o. male, here for a preventive medicine evaluation.    Subjective   Patient Active Problem List   Diagnosis    Depression    Severe anxiety       Review of Systems   Constitutional:  Negative for activity change, appetite change, fatigue, fever and unexpected weight change.   HENT:  Negative for congestion, dental problem, ear pain, facial swelling, mouth sores, postnasal drip, sinus pressure, sinus pain, sore throat and trouble swallowing.    Eyes:  Negative for pain, discharge, redness, itching and visual disturbance.   Respiratory:  Negative for cough, chest tightness, shortness of breath and wheezing.    Cardiovascular:  Negative for chest pain, palpitations and leg swelling.   Gastrointestinal:  Negative for abdominal distention, abdominal pain, blood in stool, constipation, diarrhea, nausea, rectal pain and vomiting.   Endocrine: Negative for cold intolerance and heat intolerance.   Genitourinary:  Negative for difficulty urinating, dysuria, frequency, hematuria, penile pain, scrotal swelling, testicular pain and urgency.   Musculoskeletal:  Negative for arthralgias, back pain, gait problem and neck pain.   Skin:  Negative for color change, rash and wound.   Neurological:  Negative for dizziness, seizures, weakness, light-headedness, numbness and headaches.   Psychiatric/Behavioral:  Negative for agitation and sleep disturbance.      Re order test    Prior to Visit Medications    Medication Sig Taking? Authorizing Provider   vitamin D (ERGOCALCIFEROL) 1.25 MG (10592 UT) CAPS capsule Take 1 capsule by mouth once a week  Saige Wilkinson, APRN - CNP   melatonin 3 MG TABS tablet Take 1 tablet by mouth nightly as needed  Provider, MD Faustina        No Known Allergies    Past Medical History:   Diagnosis Date    Anxiety     Depression     Hypertension        Past Surgical History:   Procedure Laterality Date    SKIN GRAFT         Right

## 2025-05-22 ENCOUNTER — RESULTS FOLLOW-UP (OUTPATIENT)
Dept: FAMILY MEDICINE CLINIC | Age: 25
End: 2025-05-22

## 2025-05-22 LAB — TESTOST SERPL-MCNC: 672 NG/DL (ref 249–836)

## 2025-05-22 NOTE — RESULT ENCOUNTER NOTE
Vitamin D level continues to be low at 21, recommended range is .  I would recommend he resume taking a vitamin D3 supplement I can send this in if he would like or he can do over-the-counter 1000 IUs daily.    Lipid panel looks great and shows a good amount of improvement from when checked last year.  Keep up the good work    Otherwise all other labs are nonconcerning.